# Patient Record
Sex: MALE | Race: WHITE | URBAN - METROPOLITAN AREA
[De-identification: names, ages, dates, MRNs, and addresses within clinical notes are randomized per-mention and may not be internally consistent; named-entity substitution may affect disease eponyms.]

---

## 2017-06-18 ENCOUNTER — EMERGENCY (EMERGENCY)
Facility: HOSPITAL | Age: 32
LOS: 0 days | Discharge: HOME | End: 2017-06-18
Attending: EMERGENCY MEDICINE | Admitting: EMERGENCY MEDICINE
Payer: COMMERCIAL

## 2017-06-18 VITALS
TEMPERATURE: 98 F | DIASTOLIC BLOOD PRESSURE: 66 MMHG | RESPIRATION RATE: 16 BRPM | SYSTOLIC BLOOD PRESSURE: 115 MMHG | HEART RATE: 115 BPM
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
## 2017-06-18 DIAGNOSIS — S01.81XA LACERATION WITHOUT FOREIGN BODY OF OTHER PART OF HEAD, INITIAL ENCOUNTER: ICD-10-CM

## 2017-06-18 DIAGNOSIS — S02.2XXA FRACTURE OF NASAL BONES, INITIAL ENCOUNTER FOR CLOSED FRACTURE: ICD-10-CM
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
## 2017-06-18 PROCEDURE — 72125 CT NECK SPINE W/O DYE: CPT | Mod: 26

## 2017-06-18 PROCEDURE — 99284 EMERGENCY DEPT VISIT MOD MDM: CPT | Mod: 25

## 2017-06-18 PROCEDURE — 12011 RPR F/E/E/N/L/M 2.5 CM/<: CPT

## 2017-06-18 PROCEDURE — 70450 CT HEAD/BRAIN W/O DYE: CPT | Mod: 26

## 2017-06-18 PROCEDURE — 70486 CT MAXILLOFACIAL W/O DYE: CPT | Mod: 26

## 2017-06-18 PROCEDURE — 76377 3D RENDER W/INTRP POSTPROCES: CPT | Mod: 26

## 2017-06-18 RX ORDER — CEPHALEXIN 500 MG
500 CAPSULE ORAL ONCE
Qty: 0 | Refills: 0 | Status: COMPLETED | OUTPATIENT
Start: 2017-06-18 | End: 2017-06-18

## 2017-06-18 RX ORDER — CEPHALEXIN 500 MG
1 CAPSULE ORAL
Qty: 28 | Refills: 0 | OUTPATIENT
Start: 2017-06-18 | End: 2017-06-25

## 2017-06-18 RX ORDER — TETANUS TOXOID, REDUCED DIPHTHERIA TOXOID AND ACELLULAR PERTUSSIS VACCINE, ADSORBED 5; 2.5; 8; 8; 2.5 [IU]/.5ML; [IU]/.5ML; UG/.5ML; UG/.5ML; UG/.5ML
0.5 SUSPENSION INTRAMUSCULAR ONCE
Qty: 0 | Refills: 0 | Status: COMPLETED | OUTPATIENT
Start: 2017-06-18 | End: 2017-06-18

## 2017-06-18 RX ADMIN — Medication 500 MILLIGRAM(S): at 05:04

## 2017-06-18 NOTE — ED PROVIDER NOTE - HEAD, MLM
Head shape is symmetrical.  No septal hematoma, no hemotympanum. No intraoral lesions, 2cm contusion to the left occipital area. No bony step-off,  No c-spine tenderness.
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
## 2017-06-18 NOTE — ED PROVIDER NOTE - CONSTITUTIONAL, MLM
normal... Appears intoxicated, well nourished, awake, alert, oriented to person, place, time/situation and in no apparent distress. Appears intoxicated, well nourished, awake, alert, oriented to person, place, time/situation

## 2017-06-18 NOTE — ED PROVIDER NOTE - MUSCULOSKELETAL, MLM
Spine appears normal, range of motion is not limited, no muscle or joint tenderness Spine appears normal, range of motion is not limited.

## 2017-06-18 NOTE — ED PROVIDER NOTE - SKIN, MLM
2cm contusion to the left occipital area. 3cm contusion to the right forehead with linear superficial lacerations extending to approx 4.5cm. 1cm V-shaped lac across the bridge of the nose. No septal hematoma, no hemotympanum. No intraoral lesions. 1cm superficial skin avulsion to left elbow. No bony deformity. 3cm contusion to the right forehead with linear superficial lacerations extending in a semi-[place] to approx 4 cm. No bony step off. 1cm V-shaped lac across the bridge of the nose, no deformity. 1cm superficial skin avulsion to left elbow. No bony deformity.
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
## 2018-07-20 ENCOUNTER — RESULT REVIEW (OUTPATIENT)
Age: 33
End: 2018-07-20

## 2018-07-20 ENCOUNTER — OUTPATIENT (OUTPATIENT)
Dept: OUTPATIENT SERVICES | Facility: HOSPITAL | Age: 33
LOS: 1 days | Discharge: HOME | End: 2018-07-20
Payer: COMMERCIAL

## 2018-07-20 VITALS
SYSTOLIC BLOOD PRESSURE: 120 MMHG | WEIGHT: 192.9 LBS | HEART RATE: 84 BPM | TEMPERATURE: 98 F | RESPIRATION RATE: 19 BRPM | HEIGHT: 69 IN | DIASTOLIC BLOOD PRESSURE: 85 MMHG | OXYGEN SATURATION: 100 %

## 2018-07-20 DIAGNOSIS — H68.109 UNSPECIFIED OBSTRUCTION OF EUSTACHIAN TUBE, UNSPECIFIED EAR: Chronic | ICD-10-CM

## 2018-07-20 PROCEDURE — 88313 SPECIAL STAINS GROUP 2: CPT | Mod: 26

## 2018-07-20 PROCEDURE — 88342 IMHCHEM/IMCYTCHM 1ST ANTB: CPT | Mod: 26

## 2018-07-20 PROCEDURE — 88312 SPECIAL STAINS GROUP 1: CPT | Mod: 26

## 2018-07-20 PROCEDURE — 88305 TISSUE EXAM BY PATHOLOGIST: CPT | Mod: 26

## 2018-07-20 RX ORDER — OMEPRAZOLE 10 MG/1
1 CAPSULE, DELAYED RELEASE ORAL
Qty: 0 | Refills: 0 | COMMUNITY

## 2018-07-25 LAB — SURGICAL PATHOLOGY FINAL REPORT - CH: SIGNIFICANT CHANGE UP

## 2018-07-26 DIAGNOSIS — R10.13 EPIGASTRIC PAIN: ICD-10-CM

## 2018-07-26 DIAGNOSIS — Z88.2 ALLERGY STATUS TO SULFONAMIDES: ICD-10-CM

## 2018-07-26 DIAGNOSIS — K29.50 UNSPECIFIED CHRONIC GASTRITIS WITHOUT BLEEDING: ICD-10-CM

## 2018-07-26 DIAGNOSIS — K62.5 HEMORRHAGE OF ANUS AND RECTUM: ICD-10-CM

## 2018-07-26 DIAGNOSIS — K76.0 FATTY (CHANGE OF) LIVER, NOT ELSEWHERE CLASSIFIED: ICD-10-CM

## 2018-07-26 DIAGNOSIS — K64.8 OTHER HEMORRHOIDS: ICD-10-CM

## 2018-07-26 DIAGNOSIS — K21.0 GASTRO-ESOPHAGEAL REFLUX DISEASE WITH ESOPHAGITIS: ICD-10-CM

## 2019-08-28 ENCOUNTER — RESULT REVIEW (OUTPATIENT)
Age: 34
End: 2019-08-28

## 2019-08-29 ENCOUNTER — TRANSCRIPTION ENCOUNTER (OUTPATIENT)
Age: 34
End: 2019-08-29

## 2019-08-29 ENCOUNTER — INPATIENT (INPATIENT)
Facility: HOSPITAL | Age: 34
LOS: 0 days | Discharge: HOME | DRG: 343 | End: 2019-08-29
Attending: SURGERY | Admitting: SURGERY
Payer: COMMERCIAL

## 2019-08-29 VITALS
RESPIRATION RATE: 19 BRPM | OXYGEN SATURATION: 98 % | DIASTOLIC BLOOD PRESSURE: 97 MMHG | HEART RATE: 98 BPM | SYSTOLIC BLOOD PRESSURE: 134 MMHG | TEMPERATURE: 98 F

## 2019-08-29 VITALS
HEART RATE: 83 BPM | SYSTOLIC BLOOD PRESSURE: 125 MMHG | DIASTOLIC BLOOD PRESSURE: 66 MMHG | TEMPERATURE: 98 F | OXYGEN SATURATION: 96 % | RESPIRATION RATE: 18 BRPM

## 2019-08-29 DIAGNOSIS — H68.109 UNSPECIFIED OBSTRUCTION OF EUSTACHIAN TUBE, UNSPECIFIED EAR: Chronic | ICD-10-CM

## 2019-08-29 DIAGNOSIS — K35.80 UNSPECIFIED ACUTE APPENDICITIS: ICD-10-CM

## 2019-08-29 PROBLEM — K76.0 FATTY (CHANGE OF) LIVER, NOT ELSEWHERE CLASSIFIED: Chronic | Status: ACTIVE | Noted: 2018-07-20

## 2019-08-29 PROBLEM — K21.9 GASTRO-ESOPHAGEAL REFLUX DISEASE WITHOUT ESOPHAGITIS: Chronic | Status: ACTIVE | Noted: 2018-07-20

## 2019-08-29 LAB
ALBUMIN SERPL ELPH-MCNC: 4.5 G/DL — SIGNIFICANT CHANGE UP (ref 3.3–5)
ALP SERPL-CCNC: 86 U/L — SIGNIFICANT CHANGE UP (ref 40–120)
ALT FLD-CCNC: 58 U/L — SIGNIFICANT CHANGE UP (ref 12–78)
ANION GAP SERPL CALC-SCNC: 8 MMOL/L — SIGNIFICANT CHANGE UP (ref 5–17)
APPEARANCE UR: CLEAR — SIGNIFICANT CHANGE UP
APTT BLD: 31.4 SEC — SIGNIFICANT CHANGE UP (ref 27.5–36.3)
AST SERPL-CCNC: 30 U/L — SIGNIFICANT CHANGE UP (ref 15–37)
BASOPHILS # BLD AUTO: 0.04 K/UL — SIGNIFICANT CHANGE UP (ref 0–0.2)
BASOPHILS NFR BLD AUTO: 0.3 % — SIGNIFICANT CHANGE UP (ref 0–2)
BILIRUB SERPL-MCNC: 0.8 MG/DL — SIGNIFICANT CHANGE UP (ref 0.2–1.2)
BILIRUB UR-MCNC: NEGATIVE — SIGNIFICANT CHANGE UP
BUN SERPL-MCNC: 13 MG/DL — SIGNIFICANT CHANGE UP (ref 7–23)
CALCIUM SERPL-MCNC: 9.8 MG/DL — SIGNIFICANT CHANGE UP (ref 8.5–10.1)
CHLORIDE SERPL-SCNC: 102 MMOL/L — SIGNIFICANT CHANGE UP (ref 96–108)
CO2 SERPL-SCNC: 30 MMOL/L — SIGNIFICANT CHANGE UP (ref 22–31)
COLOR SPEC: YELLOW — SIGNIFICANT CHANGE UP
CREAT SERPL-MCNC: 1.11 MG/DL — SIGNIFICANT CHANGE UP (ref 0.5–1.3)
DIFF PNL FLD: NEGATIVE — SIGNIFICANT CHANGE UP
EOSINOPHIL # BLD AUTO: 0.14 K/UL — SIGNIFICANT CHANGE UP (ref 0–0.5)
EOSINOPHIL NFR BLD AUTO: 1.2 % — SIGNIFICANT CHANGE UP (ref 0–6)
GLUCOSE SERPL-MCNC: 97 MG/DL — SIGNIFICANT CHANGE UP (ref 70–99)
GLUCOSE UR QL: NEGATIVE MG/DL — SIGNIFICANT CHANGE UP
HCT VFR BLD CALC: 41.5 % — SIGNIFICANT CHANGE UP (ref 39–50)
HGB BLD-MCNC: 14.1 G/DL — SIGNIFICANT CHANGE UP (ref 13–17)
IMM GRANULOCYTES NFR BLD AUTO: 0.4 % — SIGNIFICANT CHANGE UP (ref 0–1.5)
INR BLD: 1.07 RATIO — SIGNIFICANT CHANGE UP (ref 0.88–1.16)
KETONES UR-MCNC: NEGATIVE — SIGNIFICANT CHANGE UP
LEUKOCYTE ESTERASE UR-ACNC: NEGATIVE — SIGNIFICANT CHANGE UP
LIDOCAIN IGE QN: 133 U/L — SIGNIFICANT CHANGE UP (ref 73–393)
LYMPHOCYTES # BLD AUTO: 18.6 % — SIGNIFICANT CHANGE UP (ref 13–44)
LYMPHOCYTES # BLD AUTO: 2.16 K/UL — SIGNIFICANT CHANGE UP (ref 1–3.3)
MCHC RBC-ENTMCNC: 28.4 PG — SIGNIFICANT CHANGE UP (ref 27–34)
MCHC RBC-ENTMCNC: 34 GM/DL — SIGNIFICANT CHANGE UP (ref 32–36)
MCV RBC AUTO: 83.5 FL — SIGNIFICANT CHANGE UP (ref 80–100)
MONOCYTES # BLD AUTO: 0.82 K/UL — SIGNIFICANT CHANGE UP (ref 0–0.9)
MONOCYTES NFR BLD AUTO: 7.1 % — SIGNIFICANT CHANGE UP (ref 2–14)
NEUTROPHILS # BLD AUTO: 8.41 K/UL — HIGH (ref 1.8–7.4)
NEUTROPHILS NFR BLD AUTO: 72.4 % — SIGNIFICANT CHANGE UP (ref 43–77)
NITRITE UR-MCNC: NEGATIVE — SIGNIFICANT CHANGE UP
PH UR: 6 — SIGNIFICANT CHANGE UP (ref 5–8)
PLATELET # BLD AUTO: 253 K/UL — SIGNIFICANT CHANGE UP (ref 150–400)
POTASSIUM SERPL-MCNC: 3.6 MMOL/L — SIGNIFICANT CHANGE UP (ref 3.5–5.3)
POTASSIUM SERPL-SCNC: 3.6 MMOL/L — SIGNIFICANT CHANGE UP (ref 3.5–5.3)
PROT SERPL-MCNC: 7.9 GM/DL — SIGNIFICANT CHANGE UP (ref 6–8.3)
PROT UR-MCNC: NEGATIVE MG/DL — SIGNIFICANT CHANGE UP
PROTHROM AB SERPL-ACNC: 11.9 SEC — SIGNIFICANT CHANGE UP (ref 10–12.9)
RBC # BLD: 4.97 M/UL — SIGNIFICANT CHANGE UP (ref 4.2–5.8)
RBC # FLD: 13.2 % — SIGNIFICANT CHANGE UP (ref 10.3–14.5)
SODIUM SERPL-SCNC: 140 MMOL/L — SIGNIFICANT CHANGE UP (ref 135–145)
SP GR SPEC: 1.01 — SIGNIFICANT CHANGE UP (ref 1.01–1.02)
TROPONIN I SERPL-MCNC: <0.015 NG/ML — SIGNIFICANT CHANGE UP (ref 0.01–0.04)
UROBILINOGEN FLD QL: NEGATIVE MG/DL — SIGNIFICANT CHANGE UP
WBC # BLD: 11.62 K/UL — HIGH (ref 3.8–10.5)
WBC # FLD AUTO: 11.62 K/UL — HIGH (ref 3.8–10.5)

## 2019-08-29 PROCEDURE — 93005 ELECTROCARDIOGRAM TRACING: CPT

## 2019-08-29 PROCEDURE — 74176 CT ABD & PELVIS W/O CONTRAST: CPT | Mod: 26

## 2019-08-29 PROCEDURE — 12345: CPT | Mod: NC

## 2019-08-29 PROCEDURE — 44950 APPENDECTOMY: CPT | Mod: AS

## 2019-08-29 PROCEDURE — 88304 TISSUE EXAM BY PATHOLOGIST: CPT | Mod: 26

## 2019-08-29 PROCEDURE — 88304 TISSUE EXAM BY PATHOLOGIST: CPT

## 2019-08-29 PROCEDURE — 93010 ELECTROCARDIOGRAM REPORT: CPT

## 2019-08-29 PROCEDURE — 44950 APPENDECTOMY: CPT

## 2019-08-29 RX ORDER — ACETAMINOPHEN 500 MG
1000 TABLET ORAL ONCE
Refills: 0 | Status: COMPLETED | OUTPATIENT
Start: 2019-08-29 | End: 2019-08-29

## 2019-08-29 RX ORDER — HYDROMORPHONE HYDROCHLORIDE 2 MG/ML
0.5 INJECTION INTRAMUSCULAR; INTRAVENOUS; SUBCUTANEOUS
Refills: 0 | Status: DISCONTINUED | OUTPATIENT
Start: 2019-08-29 | End: 2019-08-29

## 2019-08-29 RX ORDER — SODIUM CHLORIDE 9 MG/ML
1000 INJECTION INTRAMUSCULAR; INTRAVENOUS; SUBCUTANEOUS ONCE
Refills: 0 | Status: COMPLETED | OUTPATIENT
Start: 2019-08-29 | End: 2019-08-29

## 2019-08-29 RX ORDER — CEFOTETAN DISODIUM 1 G
2 VIAL (EA) INJECTION ONCE
Refills: 0 | Status: DISCONTINUED | OUTPATIENT
Start: 2019-08-29 | End: 2019-08-29

## 2019-08-29 RX ORDER — MORPHINE SULFATE 50 MG/1
4 CAPSULE, EXTENDED RELEASE ORAL ONCE
Refills: 0 | Status: DISCONTINUED | OUTPATIENT
Start: 2019-08-29 | End: 2019-08-29

## 2019-08-29 RX ORDER — ONDANSETRON 8 MG/1
4 TABLET, FILM COATED ORAL ONCE
Refills: 0 | Status: COMPLETED | OUTPATIENT
Start: 2019-08-29 | End: 2019-08-29

## 2019-08-29 RX ORDER — MORPHINE SULFATE 50 MG/1
5 CAPSULE, EXTENDED RELEASE ORAL
Refills: 0 | Status: DISCONTINUED | OUTPATIENT
Start: 2019-08-29 | End: 2019-08-29

## 2019-08-29 RX ORDER — ACETAMINOPHEN 500 MG
650 TABLET ORAL EVERY 6 HOURS
Refills: 0 | Status: DISCONTINUED | OUTPATIENT
Start: 2019-08-29 | End: 2019-08-29

## 2019-08-29 RX ORDER — HYDROMORPHONE HYDROCHLORIDE 2 MG/ML
1 INJECTION INTRAMUSCULAR; INTRAVENOUS; SUBCUTANEOUS ONCE
Refills: 0 | Status: DISCONTINUED | OUTPATIENT
Start: 2019-08-29 | End: 2019-08-29

## 2019-08-29 RX ORDER — FENTANYL CITRATE 50 UG/ML
50 INJECTION INTRAVENOUS
Refills: 0 | Status: DISCONTINUED | OUTPATIENT
Start: 2019-08-29 | End: 2019-08-29

## 2019-08-29 RX ORDER — PIPERACILLIN AND TAZOBACTAM 4; .5 G/20ML; G/20ML
3.38 INJECTION, POWDER, LYOPHILIZED, FOR SOLUTION INTRAVENOUS ONCE
Refills: 0 | Status: DISCONTINUED | OUTPATIENT
Start: 2019-08-29 | End: 2019-08-29

## 2019-08-29 RX ORDER — SODIUM CHLORIDE 9 MG/ML
1000 INJECTION INTRAMUSCULAR; INTRAVENOUS; SUBCUTANEOUS
Refills: 0 | Status: DISCONTINUED | OUTPATIENT
Start: 2019-08-29 | End: 2019-08-29

## 2019-08-29 RX ORDER — OXYCODONE HYDROCHLORIDE 5 MG/1
5 TABLET ORAL EVERY 4 HOURS
Refills: 0 | Status: DISCONTINUED | OUTPATIENT
Start: 2019-08-29 | End: 2019-08-29

## 2019-08-29 RX ORDER — OXYCODONE HYDROCHLORIDE 5 MG/1
10 TABLET ORAL ONCE
Refills: 0 | Status: DISCONTINUED | OUTPATIENT
Start: 2019-08-29 | End: 2019-08-29

## 2019-08-29 RX ORDER — SODIUM CHLORIDE 9 MG/ML
1000 INJECTION, SOLUTION INTRAVENOUS
Refills: 0 | Status: DISCONTINUED | OUTPATIENT
Start: 2019-08-29 | End: 2019-08-29

## 2019-08-29 RX ADMIN — OXYCODONE HYDROCHLORIDE 5 MILLIGRAM(S): 5 TABLET ORAL at 10:33

## 2019-08-29 RX ADMIN — FENTANYL CITRATE 50 MICROGRAM(S): 50 INJECTION INTRAVENOUS at 05:36

## 2019-08-29 RX ADMIN — OXYCODONE HYDROCHLORIDE 5 MILLIGRAM(S): 5 TABLET ORAL at 15:10

## 2019-08-29 RX ADMIN — HYDROMORPHONE HYDROCHLORIDE 1 MILLIGRAM(S): 2 INJECTION INTRAMUSCULAR; INTRAVENOUS; SUBCUTANEOUS at 02:25

## 2019-08-29 RX ADMIN — OXYCODONE HYDROCHLORIDE 10 MILLIGRAM(S): 5 TABLET ORAL at 04:48

## 2019-08-29 RX ADMIN — Medication 650 MILLIGRAM(S): at 11:30

## 2019-08-29 RX ADMIN — Medication 650 MILLIGRAM(S): at 11:07

## 2019-08-29 RX ADMIN — OXYCODONE HYDROCHLORIDE 10 MILLIGRAM(S): 5 TABLET ORAL at 05:36

## 2019-08-29 RX ADMIN — HYDROMORPHONE HYDROCHLORIDE 1 MILLIGRAM(S): 2 INJECTION INTRAMUSCULAR; INTRAVENOUS; SUBCUTANEOUS at 02:05

## 2019-08-29 RX ADMIN — SODIUM CHLORIDE 1000 MILLILITER(S): 9 INJECTION INTRAMUSCULAR; INTRAVENOUS; SUBCUTANEOUS at 01:22

## 2019-08-29 RX ADMIN — FENTANYL CITRATE 50 MICROGRAM(S): 50 INJECTION INTRAVENOUS at 04:57

## 2019-08-29 RX ADMIN — Medication 400 MILLIGRAM(S): at 04:47

## 2019-08-29 RX ADMIN — SODIUM CHLORIDE 125 MILLILITER(S): 9 INJECTION, SOLUTION INTRAVENOUS at 06:14

## 2019-08-29 RX ADMIN — MORPHINE SULFATE 4 MILLIGRAM(S): 50 CAPSULE, EXTENDED RELEASE ORAL at 01:44

## 2019-08-29 RX ADMIN — FENTANYL CITRATE 50 MICROGRAM(S): 50 INJECTION INTRAVENOUS at 05:00

## 2019-08-29 RX ADMIN — ONDANSETRON 4 MILLIGRAM(S): 8 TABLET, FILM COATED ORAL at 01:23

## 2019-08-29 RX ADMIN — SODIUM CHLORIDE 1000 MILLILITER(S): 9 INJECTION INTRAMUSCULAR; INTRAVENOUS; SUBCUTANEOUS at 02:22

## 2019-08-29 RX ADMIN — FENTANYL CITRATE 50 MICROGRAM(S): 50 INJECTION INTRAVENOUS at 04:46

## 2019-08-29 RX ADMIN — Medication 1000 MILLIGRAM(S): at 05:35

## 2019-08-29 RX ADMIN — OXYCODONE HYDROCHLORIDE 5 MILLIGRAM(S): 5 TABLET ORAL at 11:00

## 2019-08-29 RX ADMIN — MORPHINE SULFATE 4 MILLIGRAM(S): 50 CAPSULE, EXTENDED RELEASE ORAL at 01:23

## 2019-08-29 NOTE — H&P ADULT - ASSESSMENT
A/P:  Admit toSurgery Dr Mills  Dx: Acute Appendicitis  NPO, IVF  On Call to OR for appendectomy  Pt seen /case discussed with Dr Mills

## 2019-08-29 NOTE — H&P ADULT - HISTORY OF PRESENT ILLNESS
Pt is a 34M with a  PMH of GERD  presenting with a Cc of Abd pain x 4 hours. The paain came on suddenly as a generalized "Gas Pain" with associated belching , Abd distension which became more severe and without relief after Gas-X. The pain became more severe and localized in th RLQ. No Fever/chills, No N/V, No diarrhea/constipation, No CP or dyspnea.

## 2019-08-29 NOTE — ED ADULT NURSE NOTE - OBJECTIVE STATEMENT
pt c/o sudden onsen of severe abdominal pain, thinking it might be gas. denies n/v/d, fevers/chills. no medical hx.

## 2019-08-29 NOTE — DISCHARGE NOTE NURSING/CASE MANAGEMENT/SOCIAL WORK - PATIENT PORTAL LINK FT
You can access the FollowMyHealth Patient Portal offered by [facility] by registering at the following website: [email or web address]. By joining [employer]’s FollowMyHealth portal, you will also be able to view your health information using other applications (apps) compatible with our system.

## 2019-08-29 NOTE — ED ADULT TRIAGE NOTE - CHIEF COMPLAINT QUOTE
Patient presents to ED complaining of sudden onset abdominal pain one hour ago.  Patient took Gasx and Tums PTA with no relief. As per patient he get gas pains but never like this before. Denies chest pain, SOB.

## 2019-08-29 NOTE — H&P ADULT - NSHPPHYSICALEXAM_GEN_ALL_CORE
Vital Signs Last 24 Hrs  T(C): 36.9 (29 Aug 2019 00:52), Max: 36.9 (29 Aug 2019 00:52)  T(F): 98.4 (29 Aug 2019 00:52), Max: 98.4 (29 Aug 2019 00:52)  HR: 84 (29 Aug 2019 02:03) (84 - 98)  BP: 136/81 (29 Aug 2019 02:03) (134/97 - 136/81)  BP(mean): --  RR: 18 (29 Aug 2019 02:03) (18 - 19)  SpO2: 97% (29 Aug 2019 02:03) (97% - 98%)

## 2019-08-29 NOTE — PROGRESS NOTE ADULT - SUBJECTIVE AND OBJECTIVE BOX
Subjective: POD#1    Objective: appi    Heent: N/C, AT PER no scleral icterus, No JVD  Pul: clear  Cor: RRR  Abdomen: soft, normal BS. Wound - clean  Extremities: without edema, motor/sensory intact    08-29    140  |  102  |  13  ----------------------------<  97  3.6   |  30  |  1.11    Ca    9.8      29 Aug 2019 01:23    TPro  7.9  /  Alb  4.5  /  TBili  0.8  /  DBili  x   /  AST  30  /  ALT  58  /  AlkPhos  86  08-29                            14.1   11.62 )-----------( 253      ( 29 Aug 2019 01:23 )             41.5

## 2019-08-29 NOTE — ED ADULT NURSE REASSESSMENT NOTE - NS ED NURSE REASSESS COMMENT FT1
pt report pain relief after dilaudid 1mg. pt resting comfortably in bed. no s/s of acute distress at this time. awaiting CT results.

## 2019-09-01 DIAGNOSIS — K76.0 FATTY (CHANGE OF) LIVER, NOT ELSEWHERE CLASSIFIED: ICD-10-CM

## 2019-09-01 DIAGNOSIS — K35.80 UNSPECIFIED ACUTE APPENDICITIS: ICD-10-CM

## 2019-09-01 DIAGNOSIS — K21.9 GASTRO-ESOPHAGEAL REFLUX DISEASE WITHOUT ESOPHAGITIS: ICD-10-CM

## 2020-01-28 PROBLEM — Z00.00 ENCOUNTER FOR PREVENTIVE HEALTH EXAMINATION: Status: ACTIVE | Noted: 2020-01-28

## 2020-02-06 ENCOUNTER — APPOINTMENT (OUTPATIENT)
Dept: OTOLARYNGOLOGY | Facility: CLINIC | Age: 35
End: 2020-02-06

## 2020-03-09 NOTE — PRE-OP CHECKLIST - BOWEL PREP
Patient is requesting 3 injections for right knee again.  If appropriate, please put in order.  
Prior auth placed.   
n/a

## 2020-05-21 NOTE — ASU PATIENT PROFILE, ADULT - FALLEN IN THE PAST
Niles Burns is a 34 year old male presenting for lower abdominal pain.    Denies known Latex allergy or symptoms of Latex sensitivity.  Medications verified, no changes.  Social History     Tobacco Use   Smoking Status Former Smoker   Smokeless Tobacco Never Used   Patient would like communication of their results via:        Cell Phone:   Telephone Information:   Mobile [phone]     Okay to leave a message containing results? Yes    
SUBJECTIVE:  See the nurses note.  Began with burning lower abdominal pain while mowing the lawn about 6 days ago.  Since then notices rather constant pain and some urge to urinate although he produces no urine secondary to ESRD on dialysis.  He also notes some chalky discoloration to the stool but is having no trouble passing stool.  Bowel activity does not affect the pain.  He gets some radiation around to the back.  No nausea, vomiting or weight loss.  No other symptoms.    OBJECTIVE:  Alert and pleasant.  Appears fit and athletic.  VSS.  Afebrile.  Lungs clear.  Abdomen tender suprapubically and in the LLQ without rebound or guarding.  No CVA tenderness.  CBC normal.  KUB shows moderate stool but no obstructive pattern.    ASSESSMENT:  1) Acute prostatitis.    PLAN:  1.  Bactrim DS bid for 10 days.    2.  Call PCP or nephrology 3 days if not improving.    
no

## 2020-11-24 ENCOUNTER — OUTPATIENT (OUTPATIENT)
Dept: OUTPATIENT SERVICES | Facility: HOSPITAL | Age: 35
LOS: 1 days | End: 2020-11-24
Payer: COMMERCIAL

## 2020-11-24 DIAGNOSIS — H68.109 UNSPECIFIED OBSTRUCTION OF EUSTACHIAN TUBE, UNSPECIFIED EAR: Chronic | ICD-10-CM

## 2020-11-24 DIAGNOSIS — Z11.59 ENCOUNTER FOR SCREENING FOR OTHER VIRAL DISEASES: ICD-10-CM

## 2020-11-24 LAB — SARS-COV-2 RNA SPEC QL NAA+PROBE: DETECTED

## 2020-11-24 PROCEDURE — U0003: CPT

## 2020-11-25 DIAGNOSIS — Z11.59 ENCOUNTER FOR SCREENING FOR OTHER VIRAL DISEASES: ICD-10-CM

## 2022-01-24 NOTE — ED PROVIDER NOTE - NEUROLOGICAL, MLM
Addended by: MENDEZ RAMIREZ on: 1/24/2022 08:53 AM     Modules accepted: Orders    
Alert and oriented, no focal deficits, no motor or sensory deficits.

## 2023-10-12 NOTE — ED PROVIDER NOTE - CPE EDP SKIN NORM
I agree with the resident's note above.    Patient is doing well. Pain is well controlled. Tolerating regular diet, ambulating, and voiding.  Vital signs stable      Plan:  Reg diet  Ambulating  Pain control  Routine postpartum care    gchow normal...

## 2023-11-28 ENCOUNTER — EMERGENCY (EMERGENCY)
Facility: HOSPITAL | Age: 38
LOS: 0 days | Discharge: HOME | End: 2023-11-28
Attending: STUDENT IN AN ORGANIZED HEALTH CARE EDUCATION/TRAINING PROGRAM
Payer: COMMERCIAL

## 2023-11-28 VITALS — WEIGHT: 188.05 LBS | HEART RATE: 102 BPM | HEIGHT: 69 IN | OXYGEN SATURATION: 96 %

## 2023-11-28 VITALS
SYSTOLIC BLOOD PRESSURE: 139 MMHG | DIASTOLIC BLOOD PRESSURE: 99 MMHG | HEART RATE: 94 BPM | OXYGEN SATURATION: 100 % | RESPIRATION RATE: 18 BRPM | TEMPERATURE: 99 F

## 2023-11-28 DIAGNOSIS — Z87.19 PERSONAL HISTORY OF OTHER DISEASES OF THE DIGESTIVE SYSTEM: ICD-10-CM

## 2023-11-28 DIAGNOSIS — Z20.822 CONTACT WITH AND (SUSPECTED) EXPOSURE TO COVID-19: ICD-10-CM

## 2023-11-28 DIAGNOSIS — Z88.2 ALLERGY STATUS TO SULFONAMIDES: ICD-10-CM

## 2023-11-28 DIAGNOSIS — J18.9 PNEUMONIA, UNSPECIFIED ORGANISM: ICD-10-CM

## 2023-11-28 DIAGNOSIS — R06.02 SHORTNESS OF BREATH: ICD-10-CM

## 2023-11-28 DIAGNOSIS — R05.9 COUGH, UNSPECIFIED: ICD-10-CM

## 2023-11-28 DIAGNOSIS — H68.109 UNSPECIFIED OBSTRUCTION OF EUSTACHIAN TUBE, UNSPECIFIED EAR: Chronic | ICD-10-CM

## 2023-11-28 DIAGNOSIS — R50.9 FEVER, UNSPECIFIED: ICD-10-CM

## 2023-11-28 LAB
RAPID RVP RESULT: SIGNIFICANT CHANGE UP
RAPID RVP RESULT: SIGNIFICANT CHANGE UP
SARS-COV-2 RNA SPEC QL NAA+PROBE: SIGNIFICANT CHANGE UP
SARS-COV-2 RNA SPEC QL NAA+PROBE: SIGNIFICANT CHANGE UP

## 2023-11-28 PROCEDURE — 71045 X-RAY EXAM CHEST 1 VIEW: CPT | Mod: 26

## 2023-11-28 PROCEDURE — 0225U NFCT DS DNA&RNA 21 SARSCOV2: CPT

## 2023-11-28 PROCEDURE — 71045 X-RAY EXAM CHEST 1 VIEW: CPT

## 2023-11-28 PROCEDURE — 99285 EMERGENCY DEPT VISIT HI MDM: CPT | Mod: 25

## 2023-11-28 PROCEDURE — 93010 ELECTROCARDIOGRAM REPORT: CPT

## 2023-11-28 PROCEDURE — 93005 ELECTROCARDIOGRAM TRACING: CPT

## 2023-11-28 PROCEDURE — 99284 EMERGENCY DEPT VISIT MOD MDM: CPT

## 2023-11-28 RX ORDER — AZITHROMYCIN 500 MG/1
1 TABLET, FILM COATED ORAL
Qty: 6 | Refills: 0
Start: 2023-11-28

## 2023-11-28 RX ORDER — ACETAMINOPHEN 500 MG
650 TABLET ORAL ONCE
Refills: 0 | Status: COMPLETED | OUTPATIENT
Start: 2023-11-28 | End: 2023-11-28

## 2023-11-28 RX ORDER — AMOXICILLIN 250 MG/5ML
2 SUSPENSION, RECONSTITUTED, ORAL (ML) ORAL
Qty: 60 | Refills: 0
Start: 2023-11-28 | End: 2023-12-07

## 2023-11-28 RX ORDER — HYDROCODONE BITARTRATE AND HOMATROPINE METHYLBROMIDE 5; 1.5 MG/5ML; MG/5ML
5 SOLUTION ORAL
Qty: 50 | Refills: 0
Start: 2023-11-28 | End: 2023-12-02

## 2023-11-28 RX ADMIN — Medication 650 MILLIGRAM(S): at 19:14

## 2023-11-28 NOTE — ED STATDOCS - OBJECTIVE STATEMENT
37 y/o M with a PMHx of fatty liver and GERD presents to the ED c/o viral sx. The pt stats having CP (intermittently), SOB, chills, coughing, and fever. Went to UC a few days ago with a negative viral panel. The pt is becoming concerned due to persistant sx. CP began after onset of viral symptoms. Has children with similar sx. 37 y/o M with a PMHx of fatty liver and GERD presents to the ED c/o viral sx. The pt stats having CP (intermittently), SOB, chills, coughing, and fever. Went to UC a few days ago with a negative viral panel. The pt is becoming concerned due to persistent sx. CP began after onset of viral symptoms. Has children with similar sx.

## 2023-11-28 NOTE — ED STATDOCS - PHYSICAL EXAMINATION
Gen: Well appearing in NAD   Head: NC/AT  Neck: trachea midline  Resp:  No distress  Ext: no deformities  Neuro:  A&O appears non focal  Skin:  Warm and dry as visualized  Psych:  Normal affect and mood Gen: Well appearing in NAD   Head: NC/AT  Neck: trachea midline  Resp:  No distress, clear to auscultation b/l   Ext: no deformities  Neuro:  A&O appears non focal  Skin:  Warm and dry as visualized  Psych:  Normal affect and mood

## 2023-11-28 NOTE — ED STATDOCS - CLINICAL SUMMARY MEDICAL DECISION MAKING FREE TEXT BOX
37 y/o M here for viral sx. No significant findings on exam. DDx: bronchitis, viral URI, low suspicion for PNA, but will obtain a CXR to r/o. Anticipate outpatient follow-up. 37 y/o M here for viral sx. No significant findings on exam. DDx: bronchitis, viral URI, low suspicion for PNA, but will obtain a CXR to r/o. Anticipate outpatient follow-up.          Scattered small infiltrates LLL.  Will treat with Amoxicillin and Zithromax.  Return for worsening symptoms.  RVP pending.  Hycodan for use at night.  Marie Garcia PA-C 39 y/o M here for viral sx.    No significant findings on physical exam.     DDx: bronchitis, viral URI, low suspicion for PNA, but will obtain a CXR to r/o. Anticipate outpatient follow-up.    Scattered small infiltrates LLL.  Will treat with Amoxicillin and Zithromax.  Return for worsening symptoms.  RVP pending.  Hycodan for use at night.  Marie Garcia PA-C

## 2023-11-28 NOTE — ED STATDOCS - ATTENDING APP SHARED VISIT CONTRIBUTION OF CARE
I, Maeve Alexandre MD,  I personally saw the pateint and performed a substantive portion of the visit including the following: initial face to face bedside interview with this patient regarding history of present illness, review of symptoms and relevant past medical, social and family history, independent physical examination, and medical decision making. I was the initial provider who evaluated this patient. I have discussed I have signed out the follow up of any pending tests (i.e. labs, radiological studies) to the PA/NP. I have communicated the patient’s plan of care and disposition with the PA/NP.  The history, relevant review of systems, past medical and surgical history, medical decision making, and physical examination was documented by the scribe in my presence and I attest to the accuracy of the documentation.

## 2023-11-28 NOTE — ED STATDOCS - PATIENT PORTAL LINK FT
You can access the FollowMyHealth Patient Portal offered by [facility] by registering at the following website: [email or web address]. By joining [employer]’s FollowMyHealth portal, you will also be able to view your health information using other applications (apps) compatible with our system.

## 2023-11-28 NOTE — ED STATDOCS - CARE PROVIDER_API CALL
Gaston Restrepo  Internal Medicine  [address], [address]  [place] [address]  Phone: [phone]  Fax: [phone]  Follow Up Time:

## 2023-11-28 NOTE — ED ADULT NURSE NOTE - NSFALLUNIVINTERV_ED_ALL_ED
Bed/Stretcher in lowest position, wheels locked, appropriate side rails in place/Call bell, personal items and telephone in reach/Instruct patient to call for assistance before getting out of bed/chair/stretcher/Non-slip footwear applied when patient is off stretcher/[place] to call system/Physically safe environment - no spills, clutter or unnecessary equipment/Purposeful proactive rounding/Room/bathroom lighting operational, light cord in reach

## 2023-11-28 NOTE — ED STATDOCS - PROGRESS NOTE DETAILS
PT is a 38 year old male Hx fatty liver, GERD, presents with chest pain, SOB, cough, fever x 5 days.  Pt. had negative viral panel at .  PT. concerned.  Pt. non smoker,  sick contacts at home.  .  Neg. other complaints.   Marie Garcia PA-C Scattered small infiltrates LLL.  Will treat with Amoxicillin and Zithromax.  Return for worsening symptoms.  RVP pending.  Hycodan for use at night.  Marie Garcia PA-C

## 2025-05-14 ENCOUNTER — APPOINTMENT (OUTPATIENT)
Dept: OTOLARYNGOLOGY | Facility: CLINIC | Age: 40
End: 2025-05-14